# Patient Record
Sex: MALE | Race: OTHER | NOT HISPANIC OR LATINO
[De-identification: names, ages, dates, MRNs, and addresses within clinical notes are randomized per-mention and may not be internally consistent; named-entity substitution may affect disease eponyms.]

---

## 2022-07-14 PROBLEM — Z00.00 ENCOUNTER FOR PREVENTIVE HEALTH EXAMINATION: Status: ACTIVE | Noted: 2022-07-14

## 2022-07-15 ENCOUNTER — APPOINTMENT (OUTPATIENT)
Dept: ORTHOPEDIC SURGERY | Facility: CLINIC | Age: 47
End: 2022-07-15

## 2022-07-15 VITALS — WEIGHT: 220 LBS | BODY MASS INDEX: 36.65 KG/M2 | HEIGHT: 65 IN

## 2022-07-15 DIAGNOSIS — M79.671 PAIN IN RIGHT FOOT: ICD-10-CM

## 2022-07-15 DIAGNOSIS — G57.91 UNSPECIFIED MONONEUROPATHY OF RIGHT LOWER LIMB: ICD-10-CM

## 2022-07-15 PROCEDURE — 99204 OFFICE O/P NEW MOD 45 MIN: CPT

## 2022-07-15 PROCEDURE — 73630 X-RAY EXAM OF FOOT: CPT | Mod: RT

## 2022-07-15 PROCEDURE — 73610 X-RAY EXAM OF ANKLE: CPT | Mod: RT

## 2022-07-15 RX ORDER — SULFAMETHOXAZOLE AND TRIMETHOPRIM 800; 160 MG/1; MG/1
800-160 TABLET ORAL DAILY
Qty: 10 | Refills: 0 | Status: ACTIVE | COMMUNITY
Start: 2022-07-15 | End: 1900-01-01

## 2022-07-15 NOTE — HISTORY OF PRESENT ILLNESS
[Gradual] : gradual [Dull/Aching] : dull/aching [Intermittent] : intermittent [Leisure] : leisure [Nothing helps with pain getting better] : Nothing helps with pain getting better [Walking] : walking [de-identified] : Mr. CADENA is a 47 year male who presents today for evaluation of their right foot pain including numbness tingling and burning as well as a open wound on the medial aspect of his right foot after his amputation back in March 2022 followed by a allograft skin graft and an attempted skin graft from the amputated toe.  He had a post graft infection which was debrided and he has been treated on antibiotics.  He still notices a foul smell.  He has mild pain over the amputation site on the inside aspect of the foot however most of his pain is a numbness tingling and burning of his foot.  He is using gabapentin and Tylenol which relieves his discomfort just for a short period of time.  He has started to see a pain management doctor and has a wound care clinic and a podiatrist that he is supposed to follow-up with weekly and has not been there for 3 weeks.  He denies any fever or chills. [] : no [FreeTextEntry5] : onset of pain in right foot since March 2022 after have great toe amputated. pt is noticing tenderness and a foul odor.\par onset of numbness in left foot approx. 3 years. hx of fx 2nd digit, 2021. [de-identified] : March 2022 [de-identified] : outside doctor  [de-identified] : March 2022

## 2022-07-15 NOTE — ASSESSMENT
[FreeTextEntry1] : After his examination today in the office and review of his radiographs I had a long lengthy discussion with the patient on the importance of him to keep his appointments with his wound care clinic as well as his podiatrist as well as his pain management specialist.  I have redressed his wound with a bacitracin dressing and a sterile wrap and he can continue to walk in his postsurgical shoe.  I have instructed him to follow-up and to make an appointment with his wound care clinic next week and to change his dressing twice a day with a local antibiotic cream with a sterile 4 x 4 and a sterile wrap around the midfoot wound.  We discussed the importance of elevation and strict diabetic control of his blood glucose levels which can help improve his soft tissue healing as I discussed with him that if his blood glucose levels are elevated and not controlled well this will decrease the soft tissue healing.  I have also recommended starting an antibiotic which was called into his pharmacy since he has no allergies and I also would like him to follow-up with his podiatrist as well as the infectious disease doctor who he has seen in the past to treat his localized foot infection.  He states that he will make an appointment for this Wednesday in his wound care clinic as well as with his pain management doctor as well as his podiatrist at [facility].  I discussed with him that if he is not seen on a regular basis this can increase his risk of requiring further amputation and loss of his limb

## 2024-01-01 NOTE — PHYSICAL EXAM
[de-identified] : General: Well-nourished, well developed, in no apparent distress\par Psych: Clear speech, pleasant mood and affect\par Neurological: Alert and oriented to person, place, and time\par Gait: Slight antalgic\par Respiratory: Normal respiratory effort\par Skin: No rashes, no lesions, no ecchymosis, no erythema\par \par \par Inspection: No swelling, ecchymosis or redness noted.  A open wound with exposed subcutaneous tissue is noted along the plantar and medial aspect of the midfoot without any exposed bone or tendon.  No gross drainage is noted.\par \par Alignment: Hindfoot alignment in anatomic valgus, neutral midfoot alignment.\par  \par Palpation: No anterior medial, central or lateral ankle joint line tenderness. No posterior medial or lateral ankle pain. No pain over proximal, midshaft or distal tibia or fibula. Leg Compartments are soft and nontender. Calf is soft and non-tender with a down-going Francisco test. No pain over the lateral and medial malleolus. No pain over ATFL and CFL. Non-tender over the deltoid ligament or proximal and distal syndesmosis. Negative squeeze test of the syndesmosis. Non-tender over the fibula head or neck. Non-tender over the sinus tarsi.\par No pain over the course of the achilles, peroneal, posterior tibial, anterior tibial or the extensor tendons. \par On examination of the foot: Foot compartments are soft and nontender. No pain over the heel or plantar fascia. No tenderness over the transverse tarsal joints, TMT or the Lisfranc joints on palpation and stress examination.  He is tender along the subcutaneous tissue surrounding the open wound along the medial aspect of the foot however no purulence is expressible and no drainage is noted at this time.  No tenderness over the navicular, cuboid, cuneiforms, or second, third, fourth or fifth metatarsals shafts. No pain beneath the metatarsal heads. Full range of motion through the MTP joints. The toes are reduced and well aligned. No pain on examination of the second, third, fourth or fifth toes, and no webspace tenderness noted\par \par ROM: 15 degrees of dorsiflexion, 40 degrees of plantar flexion, 20 degrees of inversion and 20 degrees of eversion without pain. Able to flex and extend all toes without pain \par \par Muscle Strength: 4+/5 strength with resisted dorsiflexion, plantar flexion, inversion and eversion without any pain.\par \par Stability: No instability or laxity noted with varus/valgus stress. Negative anterior and posterior drawer test. No pain with dorsiflexion external rotation stress test.\par \par Neurologic Exam : Sensation is grossly intact bilateral upper and lower extremities to light touch throughout. Sensation intact to the sural, posterior tibial, superficial peroneal nerve. 2+ patellar tendon and Achilles tendon reflexes are noted. There is a downgoing Babinski test. No clonus is noted bilaterally.\par \par Vascular: Arterial Pulses right and Left: posterior tibialis normal and dorsalis pedis normal. Right and Left: no edema, no varicosities and brisk capillary refill test normal.\par \par Radiographs: X-rays done today in the office 3 views of the ankle and foot without any limitations which reveal: No acute fractures or dislocations seen.  A first ray amputation is noted from the base of the first metatarsal.  No air in the soft tissues is noted.  The ankle mortise and syndesmosis are reduced and well aligned. There is no widening of the syndesmosis. No evidence of an osteochondral defect. No fractures of the lateral process of the talus or of the anterior process of the calcaneus noted. The midfoot and forefoot joints are reduced and well aligned.
Mikey